# Patient Record
Sex: MALE | Race: WHITE | NOT HISPANIC OR LATINO | URBAN - METROPOLITAN AREA
[De-identification: names, ages, dates, MRNs, and addresses within clinical notes are randomized per-mention and may not be internally consistent; named-entity substitution may affect disease eponyms.]

---

## 2023-11-28 ENCOUNTER — OFFICE VISIT (OUTPATIENT)
Dept: PRIMARY CARE | Facility: CLINIC | Age: 23
End: 2023-11-28
Payer: COMMERCIAL

## 2023-11-28 ENCOUNTER — APPOINTMENT (OUTPATIENT)
Dept: PRIMARY CARE | Facility: CLINIC | Age: 23
End: 2023-11-28
Payer: COMMERCIAL

## 2023-11-28 VITALS
OXYGEN SATURATION: 99 % | BODY MASS INDEX: 23.05 KG/M2 | SYSTOLIC BLOOD PRESSURE: 120 MMHG | HEIGHT: 70 IN | WEIGHT: 161 LBS | TEMPERATURE: 97.7 F | DIASTOLIC BLOOD PRESSURE: 54 MMHG | HEART RATE: 74 BPM

## 2023-11-28 DIAGNOSIS — S39.011D STRAIN OF ABDOMINAL MUSCLE, SUBSEQUENT ENCOUNTER: Primary | ICD-10-CM

## 2023-11-28 PROBLEM — S39.011A STRAIN OF ABDOMINAL MUSCLE: Status: ACTIVE | Noted: 2023-11-28

## 2023-11-28 PROCEDURE — 1036F TOBACCO NON-USER: CPT | Performed by: FAMILY MEDICINE

## 2023-11-28 PROCEDURE — 99213 OFFICE O/P EST LOW 20 MIN: CPT | Performed by: FAMILY MEDICINE

## 2023-11-28 RX ORDER — PREDNISONE 20 MG/1
TABLET ORAL
Qty: 18 TABLET | Refills: 0 | Status: SHIPPED | OUTPATIENT
Start: 2023-11-28 | End: 2023-12-26 | Stop reason: WASHOUT

## 2023-11-28 RX ORDER — METHOCARBAMOL 500 MG/1
500 TABLET, FILM COATED ORAL 4 TIMES DAILY PRN
Qty: 90 TABLET | Refills: 0 | Status: SHIPPED | OUTPATIENT
Start: 2023-11-28 | End: 2024-01-26 | Stop reason: ALTCHOICE

## 2023-11-28 ASSESSMENT — PATIENT HEALTH QUESTIONNAIRE - PHQ9
2. FEELING DOWN, DEPRESSED OR HOPELESS: NOT AT ALL
SUM OF ALL RESPONSES TO PHQ9 QUESTIONS 1 AND 2: 0
1. LITTLE INTEREST OR PLEASURE IN DOING THINGS: NOT AT ALL

## 2023-11-28 ASSESSMENT — PAIN SCALES - GENERAL: PAINLEVEL: 6

## 2023-11-28 NOTE — PROGRESS NOTES
Patient is here with left-sided abdominal/back discomfort.  He did see Dr. Allan for this over the summer was given ibuprofen and really seem to help much.  He does work for [employer] and does a lot of lifting.  He says with coughing sometimes also does have radiation down the left lower quadrant sometimes a little bit to his groin.  No numbness tingling or weakness.  No loss of bowel or bladder function.  Has not had any acid reflux symptoms or change with diet.  Has had no change in his poop or stool.  No fever or chills.  Some times seems to be More of the flank on the left around his lower ribs that comes around and will go down.    REVIEW OF SYSTEMS: 12 systems negative except for those mentioned in the HPI.    PHYSICAL EXAMINATION:   Constitutional: The patient is alert, in no acute  distress.  Eyes: Extraocular movements are intact.   ENT: external ear canals patent  Neck: no  JVD.  Heart: no JVD  Lungs: Normal respiration without stridor or nasal flaring   Psychiatric: Good judgment and insight. Normal affect and mood.  Skin: no rashes or lesions  MSK/Neuro/abd: Soft nontender nondistended abdomen.  There is more muscular discomfort to the insertion of the internal/external obliques on the left side especially along the rib 10 angle and going down into the insertion of the hip.  There is no bulging or discomfort in the external/inguinal ring of the left abdomen.  There is no epigastric discomfort or rebound.  Full range of motion bending twisting and side bending    Assessment:  per EMR    Plan:  I do believe this is more internal/external oblique and musculoskeletal.  Will use prednisone at this point we will send to physical therapy especially with the patient's job heavy boxes and twisting.  He has no other red flag symptoms that this is actually from the back or this is abdominal.  He does persist could consider more in the realm of blood work and CT scan of the abdomen pelvis which would also cover the back  and area though I do believe this to be more musculoskeletal is also discussed with the patient.  If not improving in the next 3 to 4 weeks especially physical therapy will return    This dictation was created using Dragon dictation and may contain errors

## 2023-11-28 NOTE — PROGRESS NOTES
Back and stomach pain keeps getting worse - x June, comes and goes   While coughing/laughing his groin starts to hurt

## 2023-12-19 ENCOUNTER — TELEPHONE (OUTPATIENT)
Dept: PRIMARY CARE | Facility: CLINIC | Age: 23
End: 2023-12-19
Payer: COMMERCIAL

## 2023-12-21 ENCOUNTER — DOCUMENTATION (OUTPATIENT)
Dept: PHYSICAL THERAPY | Facility: CLINIC | Age: 23
End: 2023-12-21
Payer: COMMERCIAL

## 2023-12-21 NOTE — PROGRESS NOTES
Physical Therapy                 Therapy Communication Note    Patient Name: Guevara Dobbs  MRN: [id number]  Today's Date: 12/21/2023     Discipline: Physical Therapy    Missed Visit Reason: Pt no showed for PT Initial Evaluation.     Missed Time: No Show

## 2023-12-26 ENCOUNTER — OFFICE VISIT (OUTPATIENT)
Dept: PRIMARY CARE | Facility: CLINIC | Age: 23
End: 2023-12-26
Payer: COMMERCIAL

## 2023-12-26 VITALS
DIASTOLIC BLOOD PRESSURE: 50 MMHG | SYSTOLIC BLOOD PRESSURE: 104 MMHG | HEIGHT: 70 IN | WEIGHT: 158 LBS | BODY MASS INDEX: 22.62 KG/M2 | HEART RATE: 73 BPM | OXYGEN SATURATION: 99 % | TEMPERATURE: 97.8 F

## 2023-12-26 DIAGNOSIS — Z00.00 WELLNESS EXAMINATION: ICD-10-CM

## 2023-12-26 DIAGNOSIS — R10.32 LEFT LOWER QUADRANT ABDOMINAL PAIN: Primary | ICD-10-CM

## 2023-12-26 DIAGNOSIS — R14.2 BURPING: ICD-10-CM

## 2023-12-26 PROCEDURE — 99214 OFFICE O/P EST MOD 30 MIN: CPT | Performed by: FAMILY MEDICINE

## 2023-12-26 PROCEDURE — 1036F TOBACCO NON-USER: CPT | Performed by: FAMILY MEDICINE

## 2023-12-26 ASSESSMENT — PATIENT HEALTH QUESTIONNAIRE - PHQ9
1. LITTLE INTEREST OR PLEASURE IN DOING THINGS: NOT AT ALL
SUM OF ALL RESPONSES TO PHQ9 QUESTIONS 1 AND 2: 0
2. FEELING DOWN, DEPRESSED OR HOPELESS: NOT AT ALL

## 2023-12-26 ASSESSMENT — PAIN SCALES - GENERAL: PAINLEVEL: 5

## 2023-12-26 NOTE — PROGRESS NOTES
Patient is here to recheck on the stomach.  He did take my advice and started noticing more so movements versus food things.  He definitely has noticed that he seems to be more so with fatty foods that he has issues where he has a lot of burping as well as a left lower discomfort.  Things to go almost to the hip and then up into the left side.  Known family he has any inflammatory bowel disease that he is aware of.  Not had any blood work consistently.  He has not noticed any issues going up or down stairs or with motion is much as he has the correlation with the food.    REVIEW OF SYSTEMS: 12 systems negative except for those mentioned in the HPI.    PHYSICAL EXAMINATION:   Constitutional: The patient is alert, in no acute  distress.  Eyes: Extraocular movements are intact. Pupils are equal and reactive to light  ENT: External TMs are clear  Neck: Supple without lymphadenopathy or JVD.  Heart: Regular rate and rhythm without murmur, click, gallop, or rub.  Lungs: Clear to auscultation bilaterally. No rales, rhonchi, or  wheezing.  Psychiatric: Good judgment and insight. Normal affect and mood.  Lymphatic: as noted above.  Skin: no rashes or lesions  MSK/neurologic: Cranials 2 through 12 grossly intact.  Abdomen: Soft nontender nondistended normal active bowel sounds.  No rebound or guarding    Assessment:  per EMR    Plan:  I discussed with the patient it would believe this is more due to his stomach.  He does not seem to be anything with movement and seems to be more so secondary to the food he is eating eating especially fatty foods.  Go to do CBC CMP A1c lipid panel thyroid celiac amylase lipase as well as also fecal stool for inflammatory bowel disease.  Also we had discussed Frances's tummy fiber 1 teaspoon working up as well as also megaspore probiotic and then also fish oil Gummies to address inflammation for 2 weeks to see if this would be helpful if not follow-up with GI    This dictation was created using  Dragon dictation and may contain errors

## 2024-01-19 NOTE — PROGRESS NOTES
Guevara Dobbs is a 23 y.o. male who is referred by PCP Dr. Cameron Shelton for abdominal pain. He reports a 8 month history of abdominal pain. Prior to Summer 2023 he never had any pain. Pain is in left-midto upper abdomen and also radiates to LLQ/hip area. Occasionally radiates around to lower left back. Seems to be worse with eating. Pain will start about 30-45 minutes after eating. He has tried to eat a bit healthier with more fruits and vegetables which seems to have helped some, but pain is still occurring daily. He does get some relief if he goes and lays down on his back. Worse with fried/junk foods and spicy food. Drinks about 2 sodas per day. Dairy and gluten seems to be OK. Today pain is about an 8/10. He has lots of bloating and after a few bites gets very full. Some heartburn and reflux, but no nausea or vomiting. Diarrhea once or twice a week. The stools the rest of the week are normal. No rectal bleeding. No oral ulcers, skin rashes, or joint pains. He initially tried a muscle relaxer because were unsure if pain due to oblique strain due to the location. This did not really help. Labs including CBC, CMP, lipase, and celiac serologies as well as fecal calprotectin ordered by his PCP are pending. He does not take NSAIDS regularly. No prior EGD or colonoscopy.    Social history: In school and also working part-time at [employer].    Family history: Denies family history of IBD, colon cancer, or other GI disorders or malignancy.     No past medical history on file.    No past surgical history on file.    Current Outpatient Medications   Medication Sig Dispense Refill    omeprazole (PriLOSEC) 40 mg DR capsule Take 1 capsule (40 mg) by mouth once daily. Do not crush or chew. 90 capsule 3     No current facility-administered medications for this visit.     No Known Allergies    No family history on file.    Review of Systems  Review of Systems negative except as noted in HPI.    Objective     /67 (BP  "Location: Right arm, Patient Position: Sitting, BP Cuff Size: Adult)   Pulse 94   Temp 36.8 °C (98.3 °F)   Ht 1.803 m (5' 11\")   Wt 71.7 kg (158 lb)   BMI 22.04 kg/m²      Physical Exam  Constitutional:  No acute distress. Normal appearance. Not ill-appearing.  HENT:  Head normocephalic and atraumatic. Conjunctivae normal.  Cardiovascular:  Normal rate. Regular rhythm.  Pulmonary:  Pulmonary effort normal. No respiratory distress. Breath sounds clear.  Abdominal:  Abdomen is flat and soft. There is no distension. LUQ and LLQ abdominal tenderness with palpation. No rebound or guarding.  Skin: Dry.  Neurological:  Alert and oriented.  Psychiatric:  Mood and affect normal.    Assessment/Plan     23 y.o. male who presents today for initial clinic visit for 8 month history of LUQ and LLQ abdominal pain with bloating and early satiety. He also has some occasional diarrhea, heartburn, and reflux but this is not severe. There is a wide differential, for now we will start 8 weeks PPI therapy and obtain labs and stool tests including fecal calprotectin and H. Pylori stool antigen. We will also obtain imaging as he is pretty tender 8/10 on exam today.    Recommendations  Continue high fiber diet. Recommend decreasing pop.  Start Omeprazole 40 mg in AM on empty stomach every day for 8 weeks.  Obtain labs and stool test.  Obtain CT scan.  Follow up in 4-6 weeks. Please call the office at [phone] with any questions or concerns.     Electronically signed by: Alma Delia Lau CNP on 1/26/2024 at 11:36 AM      "

## 2024-01-26 ENCOUNTER — OFFICE VISIT (OUTPATIENT)
Dept: GASTROENTEROLOGY | Facility: CLINIC | Age: 24
End: 2024-01-26
Payer: COMMERCIAL

## 2024-01-26 ENCOUNTER — LAB (OUTPATIENT)
Dept: LAB | Facility: LAB | Age: 24
End: 2024-01-26
Payer: COMMERCIAL

## 2024-01-26 VITALS
DIASTOLIC BLOOD PRESSURE: 67 MMHG | BODY MASS INDEX: 22.12 KG/M2 | SYSTOLIC BLOOD PRESSURE: 122 MMHG | HEIGHT: 71 IN | TEMPERATURE: 98.3 F | HEART RATE: 94 BPM | WEIGHT: 158 LBS

## 2024-01-26 DIAGNOSIS — R10.32 LEFT LOWER QUADRANT ABDOMINAL PAIN: Primary | ICD-10-CM

## 2024-01-26 DIAGNOSIS — R14.2 BURPING: ICD-10-CM

## 2024-01-26 DIAGNOSIS — R68.81 EARLY SATIETY: ICD-10-CM

## 2024-01-26 DIAGNOSIS — R10.12 LUQ ABDOMINAL PAIN: ICD-10-CM

## 2024-01-26 DIAGNOSIS — R10.32 LEFT LOWER QUADRANT ABDOMINAL PAIN: ICD-10-CM

## 2024-01-26 DIAGNOSIS — Z00.00 WELLNESS EXAMINATION: ICD-10-CM

## 2024-01-26 LAB
25(OH)D3 SERPL-MCNC: 15 NG/ML (ref 30–100)
ALBUMIN SERPL BCP-MCNC: 4.8 G/DL (ref 3.4–5)
ALP SERPL-CCNC: 48 U/L (ref 33–120)
ALT SERPL W P-5'-P-CCNC: 9 U/L (ref 10–52)
AMYLASE SERPL-CCNC: 35 U/L (ref 29–103)
ANION GAP SERPL CALC-SCNC: 11 MMOL/L (ref 10–20)
AST SERPL W P-5'-P-CCNC: 14 U/L (ref 9–39)
BASOPHILS # BLD AUTO: 0.03 X10*3/UL (ref 0–0.1)
BASOPHILS NFR BLD AUTO: 0.7 %
BILIRUB SERPL-MCNC: 0.4 MG/DL (ref 0–1.2)
BUN SERPL-MCNC: 8 MG/DL (ref 6–23)
CALCIUM SERPL-MCNC: 9.8 MG/DL (ref 8.6–10.3)
CHLORIDE SERPL-SCNC: 105 MMOL/L (ref 98–107)
CHOLEST SERPL-MCNC: 131 MG/DL (ref 0–199)
CHOLESTEROL/HDL RATIO: 2.9
CO2 SERPL-SCNC: 27 MMOL/L (ref 21–32)
CREAT SERPL-MCNC: 0.99 MG/DL (ref 0.5–1.3)
CREAT UR-MCNC: 120.6 MG/DL (ref 20–370)
EGFRCR SERPLBLD CKD-EPI 2021: >90 ML/MIN/1.73M*2
EOSINOPHIL # BLD AUTO: 0.05 X10*3/UL (ref 0–0.7)
EOSINOPHIL NFR BLD AUTO: 1.2 %
ERYTHROCYTE [DISTWIDTH] IN BLOOD BY AUTOMATED COUNT: 12.5 % (ref 11.5–14.5)
GLIADIN PEPTIDE IGA SER IA-ACNC: <1 U/ML
GLIADIN PEPTIDE IGG SER IA-ACNC: NORMAL
GLUCOSE SERPL-MCNC: 100 MG/DL (ref 74–99)
HCT VFR BLD AUTO: 45.6 % (ref 41–52)
HDLC SERPL-MCNC: 44.9 MG/DL
HGB BLD-MCNC: 15.6 G/DL (ref 13.5–17.5)
IMM GRANULOCYTES # BLD AUTO: 0.01 X10*3/UL (ref 0–0.7)
IMM GRANULOCYTES NFR BLD AUTO: 0.2 % (ref 0–0.9)
LDLC SERPL CALC-MCNC: 76 MG/DL
LIPASE SERPL-CCNC: 28 U/L (ref 9–82)
LYMPHOCYTES # BLD AUTO: 1.23 X10*3/UL (ref 1.2–4.8)
LYMPHOCYTES NFR BLD AUTO: 30 %
MCH RBC QN AUTO: 29.8 PG (ref 26–34)
MCHC RBC AUTO-ENTMCNC: 34.2 G/DL (ref 32–36)
MCV RBC AUTO: 87 FL (ref 80–100)
MICROALBUMIN UR-MCNC: <7 MG/L
MICROALBUMIN/CREAT UR: NORMAL MG/G{CREAT}
MONOCYTES # BLD AUTO: 0.33 X10*3/UL (ref 0.1–1)
MONOCYTES NFR BLD AUTO: 8 %
NEUTROPHILS # BLD AUTO: 2.45 X10*3/UL (ref 1.2–7.7)
NEUTROPHILS NFR BLD AUTO: 59.9 %
NON HDL CHOLESTEROL: 86 MG/DL (ref 0–149)
NRBC BLD-RTO: 0 /100 WBCS (ref 0–0)
PLATELET # BLD AUTO: 220 X10*3/UL (ref 150–450)
POTASSIUM SERPL-SCNC: 4.2 MMOL/L (ref 3.5–5.3)
PROT SERPL-MCNC: 7.3 G/DL (ref 6.4–8.2)
RBC # BLD AUTO: 5.23 X10*6/UL (ref 4.5–5.9)
SODIUM SERPL-SCNC: 139 MMOL/L (ref 136–145)
TRIGL SERPL-MCNC: 53 MG/DL (ref 0–149)
TSH SERPL-ACNC: 1.13 MIU/L (ref 0.44–3.98)
TTG IGA SER IA-ACNC: <1 U/ML
TTG IGG SER IA-ACNC: NORMAL
VLDL: 11 MG/DL (ref 0–40)
WBC # BLD AUTO: 4.1 X10*3/UL (ref 4.4–11.3)

## 2024-01-26 PROCEDURE — 36415 COLL VENOUS BLD VENIPUNCTURE: CPT

## 2024-01-26 PROCEDURE — 83516 IMMUNOASSAY NONANTIBODY: CPT | Performed by: FAMILY MEDICINE

## 2024-01-26 PROCEDURE — 86258 DGP ANTIBODY EACH IG CLASS: CPT

## 2024-01-26 PROCEDURE — 86364 TISS TRNSGLTMNASE EA IG CLAS: CPT

## 2024-01-26 PROCEDURE — 99204 OFFICE O/P NEW MOD 45 MIN: CPT | Performed by: NURSE PRACTITIONER

## 2024-01-26 PROCEDURE — 83690 ASSAY OF LIPASE: CPT

## 2024-01-26 PROCEDURE — 85025 COMPLETE CBC W/AUTO DIFF WBC: CPT

## 2024-01-26 PROCEDURE — 82570 ASSAY OF URINE CREATININE: CPT

## 2024-01-26 PROCEDURE — 1036F TOBACCO NON-USER: CPT | Performed by: NURSE PRACTITIONER

## 2024-01-26 PROCEDURE — 82150 ASSAY OF AMYLASE: CPT

## 2024-01-26 PROCEDURE — 99214 OFFICE O/P EST MOD 30 MIN: CPT | Performed by: NURSE PRACTITIONER

## 2024-01-26 PROCEDURE — 82043 UR ALBUMIN QUANTITATIVE: CPT

## 2024-01-26 PROCEDURE — 82306 VITAMIN D 25 HYDROXY: CPT

## 2024-01-26 PROCEDURE — 84443 ASSAY THYROID STIM HORMONE: CPT

## 2024-01-26 PROCEDURE — 80061 LIPID PANEL: CPT

## 2024-01-26 PROCEDURE — 80053 COMPREHEN METABOLIC PANEL: CPT

## 2024-01-26 PROCEDURE — 86003 ALLG SPEC IGE CRUDE XTRC EA: CPT

## 2024-01-26 RX ORDER — OMEPRAZOLE 40 MG/1
40 CAPSULE, DELAYED RELEASE ORAL DAILY
Qty: 90 CAPSULE | Refills: 3 | Status: SHIPPED | OUTPATIENT
Start: 2024-01-26 | End: 2025-01-25

## 2024-01-26 NOTE — PATIENT INSTRUCTIONS
At this time I am unsure of the exact cause of your abdominal pain, bloating, and fullness. We need to obtain blood work, stool tests, and imaging to further evaluate.    Recommendations  Continue high fiber diet. Recommend decreasing pop.  Start Omeprazole 40 mg in AM on empty stomach every day for 8 weeks. Wait to start medication until after you give stool sample as this can affect some of the results.  Obtain labs and stool test as ordered by your primary doctor.  We will obtain CT scan as you are pretty tender today. Imaging will call you to schedule.  Follow up in 4-6 weeks. Please call the office at [phone] with any questions or concerns.

## 2024-01-27 LAB
CLAM IGE QN: <0.1 KU/L
CODFISH IGE QN: <0.1 KU/L
CORN IGE QN: <0.1
EGG WHITE IGE QN: <0.1 KU/L
MILK IGE QN: <0.1 KU/L
PEANUT IGE QN: <0.1 KU/L
SCALLOP IGE QN: <0.1 KU/L
SESAME SEED IGE QN: <0.1 KU/L
SHRIMP IGE QN: <0.1 KU/L
SOYBEAN IGE QN: <0.1 KU/L
WALNUT IGE QN: <0.1 KU/L
WHEAT IGE QN: <0.1 KU/L

## 2024-01-29 LAB
GLIADIN PEPTIDE IGG SER IA-ACNC: 1.45 FLU (ref 0–4.99)
TTG IGG SER IA-ACNC: <0.82 FLU (ref 0–4.99)

## 2024-02-05 ENCOUNTER — LAB (OUTPATIENT)
Dept: LAB | Facility: LAB | Age: 24
End: 2024-02-05
Payer: COMMERCIAL

## 2024-02-05 DIAGNOSIS — R68.81 EARLY SATIETY: ICD-10-CM

## 2024-02-05 DIAGNOSIS — R14.2 BURPING: ICD-10-CM

## 2024-02-05 DIAGNOSIS — R10.12 LUQ ABDOMINAL PAIN: ICD-10-CM

## 2024-02-05 PROCEDURE — 87449 NOS EACH ORGANISM AG IA: CPT

## 2024-02-08 LAB — H PYLORI AG STL QL IA: NEGATIVE

## 2024-02-15 ENCOUNTER — HOSPITAL ENCOUNTER (OUTPATIENT)
Dept: RADIOLOGY | Facility: CLINIC | Age: 24
Discharge: HOME | End: 2024-02-15
Payer: COMMERCIAL

## 2024-02-15 DIAGNOSIS — R10.32 LEFT LOWER QUADRANT ABDOMINAL PAIN: ICD-10-CM

## 2024-02-15 DIAGNOSIS — R68.81 EARLY SATIETY: ICD-10-CM

## 2024-02-15 DIAGNOSIS — R10.12 LUQ ABDOMINAL PAIN: ICD-10-CM

## 2024-02-15 PROCEDURE — 74177 CT ABD & PELVIS W/CONTRAST: CPT | Performed by: STUDENT IN AN ORGANIZED HEALTH CARE EDUCATION/TRAINING PROGRAM

## 2024-02-15 PROCEDURE — 74177 CT ABD & PELVIS W/CONTRAST: CPT

## 2024-02-15 PROCEDURE — 2550000001 HC RX 255 CONTRASTS: Performed by: FAMILY MEDICINE

## 2024-02-15 RX ADMIN — IOHEXOL 75 ML: 350 INJECTION, SOLUTION INTRAVENOUS at 11:06
